# Patient Record
Sex: FEMALE | Race: WHITE | Employment: FULL TIME | URBAN - METROPOLITAN AREA
[De-identification: names, ages, dates, MRNs, and addresses within clinical notes are randomized per-mention and may not be internally consistent; named-entity substitution may affect disease eponyms.]

---

## 2022-12-19 ENCOUNTER — OFFICE VISIT (OUTPATIENT)
Dept: ORTHOPEDIC SURGERY | Age: 62
End: 2022-12-19
Payer: COMMERCIAL

## 2022-12-19 VITALS — WEIGHT: 172 LBS | HEIGHT: 64 IN | BODY MASS INDEX: 29.37 KG/M2

## 2022-12-19 DIAGNOSIS — S86.302A INJURY OF PERONEAL TENDON OF LEFT FOOT, INITIAL ENCOUNTER: ICD-10-CM

## 2022-12-19 DIAGNOSIS — M19.072 ARTHRITIS OF LEFT ANKLE: Primary | ICD-10-CM

## 2022-12-19 PROCEDURE — G8427 DOCREV CUR MEDS BY ELIG CLIN: HCPCS | Performed by: ORTHOPAEDIC SURGERY

## 2022-12-19 PROCEDURE — G8419 CALC BMI OUT NRM PARAM NOF/U: HCPCS | Performed by: ORTHOPAEDIC SURGERY

## 2022-12-19 PROCEDURE — 99204 OFFICE O/P NEW MOD 45 MIN: CPT | Performed by: ORTHOPAEDIC SURGERY

## 2022-12-19 PROCEDURE — 3017F COLORECTAL CA SCREEN DOC REV: CPT | Performed by: ORTHOPAEDIC SURGERY

## 2022-12-19 PROCEDURE — G8484 FLU IMMUNIZE NO ADMIN: HCPCS | Performed by: ORTHOPAEDIC SURGERY

## 2022-12-19 PROCEDURE — 4004F PT TOBACCO SCREEN RCVD TLK: CPT | Performed by: ORTHOPAEDIC SURGERY

## 2022-12-19 NOTE — PROGRESS NOTES
Date:  2022    Name:  Juan C Black  Address:  [place] [id number]    :  1960      Age:   60 y.o.    SSN:  [id number]      Medical Record Number:  [id number]    Reason for Visit:    Chief Complaint    No chief complaint on file. DOS:2022     HPI: Bayron Calloway is a 60 y.o. female here today for left ankle pain mostly located distal fibula along peroneal tendons. She underwent distal tibia open reduction internal fixation by Dr. Ashkan Mistry at [facility] in 2018. Ever since the surgery she continued to have severe left ankle pain constant swelling tingling sensation. She is hurting with weightbearing. Has a appointment for EMG testing for nerve damage 2023. Denies f/c/s/cp/sob. ROS: All systems reviewed on patient intake form. Pertinent items are noted in HPI. Past Medical History:   Diagnosis Date    Arthritis     Bell's palsy     5 times    Cancer (HCC)     cervical    PONV (postoperative nausea and vomiting)         Past Surgical History:   Procedure Laterality Date    CARPAL TUNNEL RELEASE Bilateral     HYSTERECTOMY      JOINT REPLACEMENT Left     knee    KNEE ARTHROSCOPY Right 1/8/15    partial meniscectomy,synovectomy    KNEE SURGERY Left     OTHER SURGICAL HISTORY Right 2016    RIGHT TOTAL KNEE ARTHROPLASTY                No family history on file. Social History     Socioeconomic History    Marital status:    Tobacco Use    Smoking status: Every Day     Packs/day: 0.50     Years: 15.00     Pack years: 7.50     Types: Cigarettes    Smokeless tobacco: Never   Substance and Sexual Activity    Drug use: No       Current Outpatient Medications   Medication Sig Dispense Refill    predniSONE (DELTASONE) 20 MG tablet Take 1 tablet by mouth 3 times daily Take one pill three times daily X seven days, then one pill twice daily x seven days.  35 tablet 0    oxyCODONE-acetaminophen (PERCOCET) 5-325 MG per tablet Take 2 tablets by mouth 3 times daily as needed for Pain 60 tablet 0    aspirin 325 MG tablet Take 1 tablet by mouth 2 times daily 60 tablet 0    multivitamin-iron-minerals-folic acid (CENTRUM) chewable tablet Take 1 tablet by mouth daily      Omeprazole-Sodium Bicarbonate (ZEGERID PO) Take by mouth      cetirizine (ZYRTEC) 10 MG tablet Take 10 mg by mouth daily      levothyroxine (SYNTHROID) 100 MCG tablet        No current facility-administered medications for this visit. No Known Allergies    Vital signs: There were no vitals taken for this visit. L ankle exam    Gait: No use of assistive devices. antalgic gait. Inspection/skin: + deformity swelling at left ankle. Palpation: Nontender to palpation about the medial and lateral malleolus, base of the fifth metatarsal.  Pain in ankle joint    Range of Motion: active plantar/dorsiflexion, eversion and inversion. Limited motion with pain    Strength: 5 over 5 and symmetric strength with eversion and inversion    Effusion: + effusion. Neurologic and vascular: The skin is warm and well perfused throughout. Sensation intact to light touch but numbness at bottom of her foot    + pain at peroneal tendon      R ankle comparison exam    Gait: No use of assistive devices. No antalgic gait. Inspection/skin: No apparent deformity or abnormality. No significant edema, or ecchymosis. Palpation: Nontender to palpation about the medial and lateral malleolus, base of the fifth metatarsal, proximal and distal medial metatarsals, tibial diaphysis. Nontender palpation along the insertion of the Achilles tendon. Range of Motion: Full ROM. Normal plantar/dorsiflexion, eversion and inversion. Strength: 5 over 5 and symmetric strength with eversion and inversion    Effusion: No apparent effusion. Neurologic and vascular: The skin is warm and well perfused throughout.   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 Distal tibia plate intact, no sign of tib, fib non-union. Assessment: 60 y.o. female with left talus AVN and collapse, s/p tib ORIF with intact hardware. Plan: Pertinent imaging was reviewed. The etiology, natural history, and treatment options for the disorder were discussed. The roles of activity medication, antiinflammatories, injections, bracing, physical therapy, and surgical interventions were all described to the patient and questions were answered. Referral to our trauma and foot and ankle specialist Dr. Rosie Childers. Samra Holly is in agreement with this plan. All questions were answered to patient's satisfaction and was encouraged to call with any further questions. The patient was advised that NSAID-type medications have several potential side effects that include: gastrointestinal irritation including hemorrhage, renal injury, as well as an increased risk for heart attack and stroke. The patient was asked to take the medication with food and to stop if there is any symptoms of GI upset, including heartburn, nausea, increased gas or diarrhea. I asked the patient to contact their medical provider for vomiting, abdominal pain or black/bloody stools. The patient should have renal function testing per his medical provider periodically if the medication is taken on a regular basis. The patient should be alert for any swelling in the lower extremities and should stop taking the medication immediately and contact their medical provider should this occur. In addition, the patient should stop taking the medication immediately and contact their medical provider should there be any shortness of breath, fatigue and be evaluated in an emergency facility for any chest pain. The patient expresses understanding of these issues and questions were answered.       Total time spent for evaluation, education, and development of treatment plan: 55 minutes. Brandie Clark MD  [facility] Clinical Fellow  12/19/2022    No orders of the defined types were placed in this encounter. I attest that I met personally with the patient, performed the described exam, reviewed the radiographic studies and medical records associated with this patient and supervised the services that are described above.      Darien Orellana MD

## 2023-02-07 ENCOUNTER — OFFICE VISIT (OUTPATIENT)
Dept: ORTHOPEDIC SURGERY | Age: 63
End: 2023-02-07

## 2023-02-07 VITALS — WEIGHT: 173 LBS | HEIGHT: 64 IN | BODY MASS INDEX: 29.53 KG/M2

## 2023-02-07 DIAGNOSIS — M19.072 ARTHRITIS OF LEFT ANKLE: Primary | ICD-10-CM

## 2023-02-07 DIAGNOSIS — G89.29 CHRONIC PAIN OF LEFT ANKLE: ICD-10-CM

## 2023-02-07 DIAGNOSIS — F17.200 CURRENT SMOKER: ICD-10-CM

## 2023-02-07 DIAGNOSIS — M25.572 CHRONIC PAIN OF LEFT ANKLE: ICD-10-CM

## 2023-02-07 RX ORDER — NAPROXEN 500 MG/1
500 TABLET ORAL 2 TIMES DAILY WITH MEALS
Qty: 60 TABLET | Refills: 0 | Status: SHIPPED | OUTPATIENT
Start: 2023-02-07 | End: 2023-03-09

## 2023-02-12 PROBLEM — F17.200 CURRENT SMOKER: Status: ACTIVE | Noted: 2023-02-12

## 2023-02-13 ENCOUNTER — TELEPHONE (OUTPATIENT)
Dept: ORTHOPEDIC SURGERY | Age: 63
End: 2023-02-13

## 2023-02-13 NOTE — TELEPHONE ENCOUNTER
Patient given central scheduling number [phone]    [address] [place] [address] [employer]    THE THE [facility]  [address]  [facility], [address]     [address]   [phone]  [facility], [address] [facility]

## 2023-02-13 NOTE — TELEPHONE ENCOUNTER
Patient wants to know where is the closest CT location that she is able to go to. She lives in [place]. Only about 20 minutes away from from [facility]. She said somewhere near this  office would be good for her.

## 2023-02-15 ENCOUNTER — HOSPITAL ENCOUNTER (OUTPATIENT)
Dept: CT IMAGING | Age: 63
Discharge: HOME | End: 2023-02-15
Payer: COMMERCIAL

## 2023-02-15 DIAGNOSIS — M19.072 ARTHRITIS OF LEFT ANKLE: ICD-10-CM

## 2023-02-15 DIAGNOSIS — G89.29 CHRONIC PAIN OF LEFT ANKLE: ICD-10-CM

## 2023-02-15 DIAGNOSIS — M25.572 CHRONIC PAIN OF LEFT ANKLE: ICD-10-CM

## 2023-02-15 PROCEDURE — 73700 CT LOWER EXTREMITY W/O DYE: CPT

## 2023-02-28 ENCOUNTER — OFFICE VISIT (OUTPATIENT)
Dept: ORTHOPEDIC SURGERY | Age: 63
End: 2023-02-28

## 2023-02-28 VITALS — BODY MASS INDEX: 29.53 KG/M2 | WEIGHT: 173 LBS | HEIGHT: 64 IN

## 2023-02-28 DIAGNOSIS — F17.200 CURRENT SMOKER: ICD-10-CM

## 2023-02-28 DIAGNOSIS — M87.00 AVN (AVASCULAR NECROSIS OF BONE) (HCC): ICD-10-CM

## 2023-02-28 DIAGNOSIS — M19.072 ARTHRITIS OF LEFT ANKLE: Primary | ICD-10-CM

## 2023-02-28 DIAGNOSIS — G62.9 NEUROPATHY: ICD-10-CM

## 2023-02-28 NOTE — LETTER
February 28, 2023       Negra Sanchez YOB: 1960   [address] Date of Visit:  2/28/2023       To Whom It May Concern: It is my medical opinion that Terrie King may return to work on 03- with no restrictions applied. .    If you have any questions or concerns, please don't hesitate to call.     Sincerely,        Carolee Wagner MD

## 2023-03-04 PROBLEM — G62.9 NEUROPATHY: Status: ACTIVE | Noted: 2023-03-04

## 2023-03-04 NOTE — PROGRESS NOTES
CHIEF COMPLAINT:   1- Right ankle pain/ ankle and subtalar arthritis with talus collapse/ AVN. 2- Neuropathy. HISTORY:  Ms. Markus Garces 58 y.o.  female presents today for f/u evaluation of left ankle pain which started 2018 after she had ORIF left distal tibia for what appears to be a stress fracture by Dr Lizzy Smith at [facility] in Nov 2018. She is complaining of sharp pain, 1/10. Alleviating factors:  elevation and rest. Pain is increase with standing and walking and shoe wear. Pain is sharp with first few steps, dull achy pain by the end of the day. No radiation and no numbness and tingling sensation. No other complaint.        Past Medical History:   Diagnosis Date    Arthritis     Bell's palsy     5 times    Cancer (HCC)     cervical    High blood pressure     Neuropathy     PONV (postoperative nausea and vomiting)        Past Surgical History:   Procedure Laterality Date    CARPAL TUNNEL RELEASE Bilateral     HYSTERECTOMY (CERVIX STATUS UNKNOWN)      JOINT REPLACEMENT Left     knee    KNEE ARTHROSCOPY Right 1/8/15    partial meniscectomy,synovectomy    KNEE SURGERY Left     OTHER SURGICAL HISTORY Right 05/17/2016    RIGHT TOTAL KNEE ARTHROPLASTY                Social History     Socioeconomic History    Marital status:      Spouse name: Not on file    Number of children: Not on file    Years of education: Not on file    Highest education level: Not on file   Occupational History    Occupation: [employer]   Tobacco Use    Smoking status: Every Day     Packs/day: 0.50     Years: 15.00     Pack years: 7.50     Types: Cigarettes    Smokeless tobacco: Never   Substance and Sexual Activity    Alcohol use: Not on file     Comment: social    Drug use: No    Sexual activity: Not on file   Other Topics Concern    Not on file   Social History Narrative    Not on file     Social Determinants of Health     Financial Resource Strain: Not on file   Food Insecurity: Not on file   Transportation Needs: Not on file Physical Activity: Not on file   Stress: Not on file   Social Connections: Not on file   Intimate Partner Violence: Not on file   Housing Stability: Not on file       Family History   Problem Relation Age of Onset    Hypertension Mother     Osteoarthritis Mother     Cancer Father        Current Outpatient Medications on File Prior to Visit   Medication Sig Dispense Refill    naproxen (NAPROSYN) 500 MG tablet Take 1 tablet by mouth 2 times daily (with meals) 60 tablet 0    predniSONE (DELTASONE) 20 MG tablet Take 1 tablet by mouth 3 times daily Take one pill three times daily X seven days, then one pill twice daily x seven days. 35 tablet 0    oxyCODONE-acetaminophen (PERCOCET) 5-325 MG per tablet Take 2 tablets by mouth 3 times daily as needed for Pain 60 tablet 0    aspirin 325 MG tablet Take 1 tablet by mouth 2 times daily 60 tablet 0    multivitamin-iron-minerals-folic acid (CENTRUM) chewable tablet Take 1 tablet by mouth daily      Omeprazole-Sodium Bicarbonate (ZEGERID PO) Take by mouth      cetirizine (ZYRTEC) 10 MG tablet Take 10 mg by mouth daily      levothyroxine (SYNTHROID) 100 MCG tablet        No current facility-administered medications on file prior to visit. Pertinent items are noted in HPI  Review of systems reviewed from Patient History Form and available in the patient's chart under the Media tab. PHYSICAL EXAMINATION:  Ms. Anne-Marie Barbour is a very pleasant 58 y.o.  female who presents today in no acute distress, awake, alert, and oriented. She is well dressed, nourished and  groomed. Patient with normal affect. Height is  5' 4\" (1.626 m), weight is 173 lb (78.5 kg), Body mass index is 29.7 kg/m². Resting respiratory rate is 16. Examination of the gait, showed that the patient walks heel-toe with a limp. Examination of left ankle showing decrease ROM compare to the other side. She has intact sensation and good pedal pulses.   She has good strength in all four planes, including eversion, and has tenderness on deep palpation over the medial ankle and subtalar joint line, with swelling and varus heel compared to the other side. The ankles are stable to drawer test bilaterally, equally. 2/7/2023              IMAGING:  Beatriz Hayes were reviewed,  3 views of the left ankle taken in office 2/7/2023, and showed no acute fracture. Moderate arthritis affecting the ankle and subtalar joint with talus and subtalar collapse/ AVN. CT scan left ankle dated 2/15/2023 was reviewed and showed; End-stage arthritic change throughout the ankle and hindfoot. The talus is sclerotic and partially collapsed, which may be due to   osteonecrosis or prior trauma. There is dorsal subluxation of the talonavicular articulation. Large mature ossifications around the ankle and hindfoot. Evidence of   tibiotalar and subtalar synovitis. Old incompletely united 4th metatarsal base fracture. 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 2- Neuropathy. PLAN: I discussed with the patient the CT scan with subtalar collapse and the treatment options. We recommended ankle brace with ROM and referral to see Dr Chandrakant Whitten. She will take NSAIDS Naprosyn. The patient smokes cigarettes, and we discussed with the patient the risks of smoking on general health and also on bone and soft tissue healing (delay and non-union), and promised to cut down or stop smoking. Smoking: Educated the patient regarding the hazards of smoking and that it harms their body in many ways. It increases the chance of developing heart disease, lung disease, cancer, and other health problems including poor bone and wound healing. The importance of smoking cessation for optimal bone and wound healing was stressed. This was communicated verbally, 5 Minutes. Procedures    Hunt Wraptor Ankle Brace     Patient was prescribed a Swedo Ankle Brace.   The left ankle will require stabilization / immobilization from this semi-rigid / rigid orthosis to improve their function. The orthosis will assist in protecting the affected area, provide functional support and facilitate healing. Patient was instructed to progress ambulation weight bearing as tolerated in the device. The patient was educated and fit by a healthcare professional with expert knowledge and specialization in brace application while under the direct supervision of the treating physician. Verbal and written instructions for the use of and application of this item were provided. They were instructed to contact the office immediately should the brace result in increased pain, decreased sensation, increased swelling or worsening of the condition.        Aldo Mukherjee MD

## 2023-03-07 ENCOUNTER — TELEPHONE (OUTPATIENT)
Dept: ORTHOPEDIC SURGERY | Age: 63
End: 2023-03-07

## 2023-04-20 DIAGNOSIS — M19.072 ARTHRITIS OF LEFT ANKLE: ICD-10-CM

## 2023-04-20 RX ORDER — NAPROXEN 500 MG/1
TABLET ORAL
Qty: 60 TABLET | Refills: 0 | Status: SHIPPED | OUTPATIENT
Start: 2023-04-20

## 2023-04-20 NOTE — TELEPHONE ENCOUNTER
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 2- Neuropathy. PLAN: I discussed with the patient the CT scan with subtalar collapse and the treatment options. We recommended ankle brace with ROM and referral to see Dr Katy Terrell. She will take NSAIDS Naprosyn.